# Patient Record
Sex: FEMALE | Race: WHITE | URBAN - METROPOLITAN AREA
[De-identification: names, ages, dates, MRNs, and addresses within clinical notes are randomized per-mention and may not be internally consistent; named-entity substitution may affect disease eponyms.]

---
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
## 2022-04-12 NOTE — PROCEDURE: COUNSELING
Take dexamethasone at 11 pm and go to lab at 8 am, they are non fasting blood work     Medications we discussed today:     1. Saxenda - daily injection   2. Wegovy - weekly injection   3. Phentermine- topiramate - tablet   
Detail Level: Zone

## 2023-02-01 NOTE — PROCEDURE: COUNSELING
From: Shreya Epperson  To: Tyler Mclaughlin  Sent: 2/1/2023 8:17 AM CST  Subject: Urology    Can’t get in until Feb 16 . They said if office calls they could move me up . Shreya please keep me in nicki for    
Detail Level: Detailed
Detail Level: Zone
